# Patient Record
Sex: MALE | Race: WHITE | NOT HISPANIC OR LATINO
[De-identification: names, ages, dates, MRNs, and addresses within clinical notes are randomized per-mention and may not be internally consistent; named-entity substitution may affect disease eponyms.]

---

## 2018-08-29 PROBLEM — Z00.00 ENCOUNTER FOR PREVENTIVE HEALTH EXAMINATION: Status: ACTIVE | Noted: 2018-08-29

## 2018-08-30 ENCOUNTER — APPOINTMENT (OUTPATIENT)
Dept: ORTHOPEDIC SURGERY | Facility: CLINIC | Age: 33
End: 2018-08-30
Payer: MEDICAID

## 2018-08-30 VITALS
BODY MASS INDEX: 25.18 KG/M2 | SYSTOLIC BLOOD PRESSURE: 112 MMHG | HEIGHT: 69 IN | DIASTOLIC BLOOD PRESSURE: 68 MMHG | HEART RATE: 69 BPM | WEIGHT: 170 LBS

## 2018-08-30 PROCEDURE — 99204 OFFICE O/P NEW MOD 45 MIN: CPT

## 2018-09-01 ENCOUNTER — OUTPATIENT (OUTPATIENT)
Dept: OUTPATIENT SERVICES | Facility: HOSPITAL | Age: 33
LOS: 1 days | End: 2018-09-01
Payer: MEDICAID

## 2018-09-01 PROCEDURE — G9001: CPT

## 2018-09-06 ENCOUNTER — OUTPATIENT (OUTPATIENT)
Dept: OUTPATIENT SERVICES | Facility: HOSPITAL | Age: 33
LOS: 1 days | End: 2018-09-06
Payer: MEDICAID

## 2018-09-06 VITALS
HEIGHT: 67.5 IN | TEMPERATURE: 98 F | SYSTOLIC BLOOD PRESSURE: 123 MMHG | OXYGEN SATURATION: 98 % | DIASTOLIC BLOOD PRESSURE: 69 MMHG | HEART RATE: 79 BPM | RESPIRATION RATE: 16 BRPM

## 2018-09-06 DIAGNOSIS — S83.242A OTHER TEAR OF MEDIAL MENISCUS, CURRENT INJURY, LEFT KNEE, INITIAL ENCOUNTER: ICD-10-CM

## 2018-09-06 DIAGNOSIS — M25.562 PAIN IN LEFT KNEE: ICD-10-CM

## 2018-09-06 DIAGNOSIS — Z01.818 ENCOUNTER FOR OTHER PREPROCEDURAL EXAMINATION: ICD-10-CM

## 2018-09-06 PROCEDURE — G0463: CPT

## 2018-09-10 ENCOUNTER — TRANSCRIPTION ENCOUNTER (OUTPATIENT)
Age: 33
End: 2018-09-10

## 2018-09-11 ENCOUNTER — APPOINTMENT (OUTPATIENT)
Dept: ORTHOPEDIC SURGERY | Facility: HOSPITAL | Age: 33
End: 2018-09-11

## 2018-09-11 ENCOUNTER — OUTPATIENT (OUTPATIENT)
Dept: OUTPATIENT SERVICES | Facility: HOSPITAL | Age: 33
LOS: 1 days | End: 2018-09-11
Payer: MEDICAID

## 2018-09-11 ENCOUNTER — RESULT REVIEW (OUTPATIENT)
Age: 33
End: 2018-09-11

## 2018-09-11 VITALS
RESPIRATION RATE: 20 BRPM | SYSTOLIC BLOOD PRESSURE: 126 MMHG | DIASTOLIC BLOOD PRESSURE: 75 MMHG | HEART RATE: 76 BPM | OXYGEN SATURATION: 100 %

## 2018-09-11 VITALS
OXYGEN SATURATION: 100 % | TEMPERATURE: 98 F | HEIGHT: 68 IN | WEIGHT: 160.72 LBS | RESPIRATION RATE: 20 BRPM | HEART RATE: 69 BPM

## 2018-09-11 DIAGNOSIS — S83.242A OTHER TEAR OF MEDIAL MENISCUS, CURRENT INJURY, LEFT KNEE, INITIAL ENCOUNTER: ICD-10-CM

## 2018-09-11 PROCEDURE — G8979: CPT | Mod: CK

## 2018-09-11 PROCEDURE — G8978: CPT | Mod: CK

## 2018-09-11 PROCEDURE — G8980: CPT | Mod: CK

## 2018-09-11 PROCEDURE — 29882 ARTHRS KNE SRG MNISC RPR M/L: CPT | Mod: LT

## 2018-09-11 PROCEDURE — 97161 PT EVAL LOW COMPLEX 20 MIN: CPT | Mod: CK

## 2018-09-11 PROCEDURE — 88304 TISSUE EXAM BY PATHOLOGIST: CPT

## 2018-09-11 PROCEDURE — 88304 TISSUE EXAM BY PATHOLOGIST: CPT | Mod: 26

## 2018-09-11 RX ORDER — OXYCODONE AND ACETAMINOPHEN 5; 325 MG/1; MG/1
1 TABLET ORAL ONCE
Qty: 0 | Refills: 0 | Status: DISCONTINUED | OUTPATIENT
Start: 2018-09-11 | End: 2018-09-11

## 2018-09-11 RX ORDER — SODIUM CHLORIDE 9 MG/ML
1000 INJECTION, SOLUTION INTRAVENOUS
Qty: 0 | Refills: 0 | Status: DISCONTINUED | OUTPATIENT
Start: 2018-09-11 | End: 2018-09-12

## 2018-09-11 RX ORDER — CEFAZOLIN SODIUM 1 G
2000 VIAL (EA) INJECTION ONCE
Qty: 0 | Refills: 0 | Status: COMPLETED | OUTPATIENT
Start: 2018-09-11 | End: 2018-09-11

## 2018-09-11 RX ORDER — IBUPROFEN 200 MG
1 TABLET ORAL
Qty: 0 | Refills: 0 | COMMUNITY

## 2018-09-11 RX ORDER — HYDROMORPHONE HYDROCHLORIDE 2 MG/ML
0.5 INJECTION INTRAMUSCULAR; INTRAVENOUS; SUBCUTANEOUS
Qty: 0 | Refills: 0 | Status: DISCONTINUED | OUTPATIENT
Start: 2018-09-11 | End: 2018-09-12

## 2018-09-11 RX ADMIN — SODIUM CHLORIDE 50 MILLILITER(S): 9 INJECTION, SOLUTION INTRAVENOUS at 10:18

## 2018-09-11 RX ADMIN — OXYCODONE AND ACETAMINOPHEN 1 TABLET(S): 5; 325 TABLET ORAL at 11:20

## 2018-09-11 RX ADMIN — HYDROMORPHONE HYDROCHLORIDE 0.5 MILLIGRAM(S): 2 INJECTION INTRAMUSCULAR; INTRAVENOUS; SUBCUTANEOUS at 10:35

## 2018-09-11 RX ADMIN — HYDROMORPHONE HYDROCHLORIDE 0.5 MILLIGRAM(S): 2 INJECTION INTRAMUSCULAR; INTRAVENOUS; SUBCUTANEOUS at 10:20

## 2018-09-11 RX ADMIN — OXYCODONE AND ACETAMINOPHEN 1 TABLET(S): 5; 325 TABLET ORAL at 10:50

## 2018-09-11 NOTE — ASU DISCHARGE PLAN (ADULT/PEDIATRIC). - NOTIFY
Swelling that continues/Bleeding that does not stop/Fever greater than 101/Numbness, color, or temperature change to extremity/Pain not relieved by Medications Numbness, tingling/Pain not relieved by Medications/Inability to Tolerate Liquids or Foods/Bleeding that does not stop/Numbness, color, or temperature change to extremity/Increased Irritability or Sluggishness/Swelling that continues/Persistent Nausea and Vomiting/Fever greater than 101

## 2018-09-11 NOTE — ASU DISCHARGE PLAN (ADULT/PEDIATRIC). - SPECIAL INSTRUCTIONS
Non weight bearing to left lower extremity  Continue use of knee immobilizer of left lower extremity  - Call your doctor if you experience:  • An increase in pain not controlled by pain medication or change in activity or  position.  • Temperature greater than 101° F.  • Redness, increased swelling or foul smelling drainage from or around the  incision.  • Numbness, tingling or a change in color or temperature of the operative extremity.  • Call your doctor immediately if you experience chest pain, shortness of breath or calf pain.  Follow all verbal and written instructions. Take medications as prescribed. DO NOT drive, operate machinery, and/or make important decisions while on prescription pain medication. DO NOT hesitate to call Doctor's office with questions or concerns.

## 2018-09-11 NOTE — ASU DISCHARGE PLAN (ADULT/PEDIATRIC). - INSTRUCTIONS
Phone: [phone]  [facility] [address], [address], [place] [address]

## 2018-09-11 NOTE — BRIEF OPERATIVE NOTE - PRE-OP DX
Bucket-handle tear of medial meniscus of left knee as current injury, initial encounter  09/11/2018    Active  Francesco Orr

## 2018-09-11 NOTE — ASU DISCHARGE PLAN (ADULT/PEDIATRIC). - MEDICATION SUMMARY - MEDICATIONS TO TAKE
I will START or STAY ON the medications listed below when I get home from the hospital:    Percocet 5/325 oral tablet  -- 1 tab(s) by mouth every 6 hours as needed for pain MDD:4  Reference #: [id number]  -- Caution federal law prohibits the transfer of this drug to any person other  than the person for whom it was prescribed.  May cause drowsiness.  Alcohol may intensify this effect.  Use care when operating dangerous machinery.  This prescription cannot be refilled.  This product contains acetaminophen.  Do not use  with any other product containing acetaminophen to prevent possible liver damage.  Using more of this medication than prescribed may cause serious breathing problems.    -- Indication: For Pain

## 2018-09-11 NOTE — BRIEF OPERATIVE NOTE - PROCEDURE
<<-----Click on this checkbox to enter Procedure Arthroscopic repair of meniscus of knee  09/11/2018  left knee  Active  [email or web address]

## 2018-09-12 PROBLEM — M25.562 PAIN IN LEFT KNEE: Chronic | Status: ACTIVE | Noted: 2018-09-06

## 2018-09-12 PROBLEM — Z86.59 PERSONAL HISTORY OF OTHER MENTAL AND BEHAVIORAL DISORDERS: Chronic | Status: ACTIVE | Noted: 2018-09-06

## 2018-09-14 ENCOUNTER — APPOINTMENT (OUTPATIENT)
Dept: ORTHOPEDIC SURGERY | Facility: HOSPITAL | Age: 33
End: 2018-09-14

## 2018-09-18 ENCOUNTER — APPOINTMENT (OUTPATIENT)
Dept: ORTHOPEDIC SURGERY | Facility: CLINIC | Age: 33
End: 2018-09-18
Payer: MEDICAID

## 2018-09-18 VITALS — HEART RATE: 69 BPM | SYSTOLIC BLOOD PRESSURE: 137 MMHG | DIASTOLIC BLOOD PRESSURE: 77 MMHG

## 2018-09-18 DIAGNOSIS — Z98.890 OTHER SPECIFIED POSTPROCEDURAL STATES: ICD-10-CM

## 2018-09-18 PROCEDURE — 99024 POSTOP FOLLOW-UP VISIT: CPT

## 2018-09-19 ENCOUNTER — EMERGENCY (EMERGENCY)
Facility: HOSPITAL | Age: 33
LOS: 1 days | Discharge: HOME | End: 2018-09-19
Attending: EMERGENCY MEDICINE
Payer: MEDICAID

## 2018-09-19 VITALS
WEIGHT: 169.98 LBS | TEMPERATURE: 98 F | OXYGEN SATURATION: 95 % | DIASTOLIC BLOOD PRESSURE: 60 MMHG | HEIGHT: 68 IN | RESPIRATION RATE: 20 BRPM | HEART RATE: 115 BPM | SYSTOLIC BLOOD PRESSURE: 140 MMHG

## 2018-09-19 PROCEDURE — 99284 EMERGENCY DEPT VISIT MOD MDM: CPT

## 2018-09-19 NOTE — ED PROVIDER NOTE - ATTENDING CONTRIBUTION TO CARE
I have seen and evaluated this patient with the resident.   I agree with the findings  unless other wise stated.  I have made appropriate changes in documentations where needed, After my face to face bedside evaluation, I am further  notinM with L knee surgery for meniscus tear, intermittent tingling and edema of L knee. No chest pain, palpitation or shortness of breath. L leg exam is normal other than L knee surgical site. Will obtain US duplex study of LLE, rule out DVT, and reevaluate.

## 2018-09-19 NOTE — ED PROVIDER NOTE - PLAN OF CARE
Outpatient followup You presented with lower leg swelling and numbness/tingling of the foot. We ruled out a blood clot in the leg, called a deep venous thrombosis or DVT, with an ultrasound scan of the veins in the leg. You should take one full-dose aspirin, once a day, for 1 week. Follow up with your primary medical doctor within 1 week of discharge and obtain a repeat ultrasound of the veins of the leg within 5 days.

## 2018-09-19 NOTE — ED ADULT NURSE NOTE - NSIMPLEMENTINTERV_GEN_ALL_ED
Implemented All Fall Risk Interventions:  [place] to call system. Call bell, personal items and telephone within reach. Instruct patient to call for assistance. Room bathroom lighting operational. Non-slip footwear when patient is off stretcher. Physically safe environment: no spills, clutter or unnecessary equipment. Stretcher in lowest position, wheels locked, appropriate side rails in place. Provide visual cue, wrist band, yellow gown, etc. Monitor gait and stability. Monitor for mental status changes and reorient to person, place, and time. Review medications for side effects contributing to fall risk. Reinforce activity limits and safety measures with patient and family.

## 2018-09-19 NOTE — ED PROVIDER NOTE - OBJECTIVE STATEMENT
33M with PMHx of anxiety, L knee pain, PSHx of L meniscus repair (recently at [facility]), presents to the ED with complaints of tingling and mild edema of LLE. 33M with PMHx of anxiety, L knee pain, PSHx of L meniscal bucket tear repair (at [place]), presents to the ED with complaints of intermittent tingling below the ball of his foot and mild edema of L knee. Notes relief with elevation of his leg. Pt saw his orthopedic surgeon yesterday for follow-up and discussed concern for presenting symptoms and was directed to the ED. Denies chest pain, shortness of breath, palpitation, or any other complaints at this time. Admits to taking percocet TID for post-surgical relief.

## 2018-09-19 NOTE — ED ADULT NURSE NOTE - OBJECTIVE STATEMENT
33y male presents to ED complaining of LLE swelling. Pt is a/ox3 states that he had meniscus repair surgery on 9/11, presents today stating he's had increased swelling in L foot since yesterday. Pt has been taking percocet for pain, had followup appointment yesterday with ortho with no complications. Pt breathing spontaneous and unlabored with lungs clear to auscultation bilaterally. Pt skin is warm, dry and intact with no edema present. Pt PERRL, with equal strength bilaterally in upper and lower extremities with full sensation. Pt denies chest pain and SOB, denies n/v/d, denies fever/chills and cough, denies dysuria, denies numbness/tingling and weakness.

## 2018-09-19 NOTE — ED PROVIDER NOTE - MEDICAL DECISION MAKING DETAILS
33M with L knee surgery for meniscus tear, intermittent tingling and edema of L knee. No chest pain, palpitation or shortness of breath. L leg exam is normal other than L knee surgical site. Will obtain US duplex study of LLE, rule out DVT, and reevaluate.

## 2018-09-19 NOTE — ED PROVIDER NOTE - CARE PLAN
Principal Discharge DX:	Acute pain of left knee  Goal:	Outpatient followup  Assessment and plan of treatment:	You presented with lower leg swelling and numbness/tingling of the foot. We ruled out a blood clot in the leg, called a deep venous thrombosis or DVT, with an ultrasound scan of the veins in the leg. You should take one full-dose aspirin, once a day, for 1 week. Follow up with your primary medical doctor within 1 week of discharge and obtain a repeat ultrasound of the veins of the leg within 5 days.

## 2018-09-19 NOTE — ED PROVIDER NOTE - MUSCULOSKELETAL MINIMAL EXAM
No edema. Intact sensation BL. Intact motor on flexion BL. L foot is cooler than R foot. DP pulses BL. No tenderness to palpation BL. No erythema./motor intact

## 2018-09-20 ENCOUNTER — APPOINTMENT (OUTPATIENT)
Dept: ORTHOPEDIC SURGERY | Facility: CLINIC | Age: 33
End: 2018-09-20

## 2018-09-20 VITALS
RESPIRATION RATE: 17 BRPM | DIASTOLIC BLOOD PRESSURE: 73 MMHG | SYSTOLIC BLOOD PRESSURE: 125 MMHG | HEART RATE: 77 BPM | OXYGEN SATURATION: 100 %

## 2018-09-20 PROCEDURE — 93971 EXTREMITY STUDY: CPT | Mod: 26

## 2018-09-20 PROCEDURE — 99284 EMERGENCY DEPT VISIT MOD MDM: CPT

## 2018-09-20 PROCEDURE — 93971 EXTREMITY STUDY: CPT

## 2018-09-20 RX ORDER — ASPIRIN/CALCIUM CARB/MAGNESIUM 324 MG
1 TABLET ORAL
Qty: 5 | Refills: 0 | OUTPATIENT
Start: 2018-09-20 | End: 2018-09-24

## 2018-09-22 DIAGNOSIS — Z98.890 OTHER SPECIFIED POSTPROCEDURAL STATES: Chronic | ICD-10-CM

## 2018-09-24 ENCOUNTER — CHART COPY (OUTPATIENT)
Age: 33
End: 2018-09-24

## 2018-09-24 DIAGNOSIS — Z71.89 OTHER SPECIFIED COUNSELING: ICD-10-CM

## 2018-10-02 ENCOUNTER — APPOINTMENT (OUTPATIENT)
Dept: ORTHOPEDIC SURGERY | Facility: CLINIC | Age: 33
End: 2018-10-02
Payer: MEDICAID

## 2018-10-02 VITALS — HEART RATE: 56 BPM | DIASTOLIC BLOOD PRESSURE: 71 MMHG | SYSTOLIC BLOOD PRESSURE: 122 MMHG

## 2018-10-02 PROCEDURE — 99024 POSTOP FOLLOW-UP VISIT: CPT

## 2018-10-11 NOTE — ED PROVIDER NOTE - CONSTITUTIONAL, MLM
I have allowed him to only be seen once a year  He is due for his 1 year follow-up    Refills declined normal... Well appearing, well nourished, awake, alert, oriented to person, place, time/situation and in no apparent distress.

## 2018-10-23 ENCOUNTER — APPOINTMENT (OUTPATIENT)
Dept: ORTHOPEDIC SURGERY | Facility: CLINIC | Age: 33
End: 2018-10-23
Payer: MEDICAID

## 2018-10-23 VITALS
DIASTOLIC BLOOD PRESSURE: 80 MMHG | BODY MASS INDEX: 25.03 KG/M2 | WEIGHT: 169 LBS | SYSTOLIC BLOOD PRESSURE: 128 MMHG | HEART RATE: 62 BPM | HEIGHT: 69 IN

## 2018-10-23 PROCEDURE — 99024 POSTOP FOLLOW-UP VISIT: CPT

## 2018-12-04 ENCOUNTER — APPOINTMENT (OUTPATIENT)
Dept: ORTHOPEDIC SURGERY | Facility: CLINIC | Age: 33
End: 2018-12-04
Payer: MEDICAID

## 2018-12-04 VITALS
BODY MASS INDEX: 24.59 KG/M2 | DIASTOLIC BLOOD PRESSURE: 66 MMHG | HEIGHT: 69 IN | HEART RATE: 75 BPM | SYSTOLIC BLOOD PRESSURE: 111 MMHG | WEIGHT: 166 LBS

## 2018-12-04 PROCEDURE — 99024 POSTOP FOLLOW-UP VISIT: CPT

## 2018-12-04 RX ORDER — OXYCODONE AND ACETAMINOPHEN 5; 325 MG/1; MG/1
5-325 TABLET ORAL
Qty: 40 | Refills: 0 | Status: DISCONTINUED | COMMUNITY
Start: 2018-09-24 | End: 2018-12-04

## 2019-01-16 ENCOUNTER — APPOINTMENT (OUTPATIENT)
Dept: ORTHOPEDIC SURGERY | Facility: CLINIC | Age: 34
End: 2019-01-16
Payer: MEDICAID

## 2019-01-16 VITALS — BODY MASS INDEX: 24.59 KG/M2 | HEIGHT: 69 IN | WEIGHT: 166 LBS

## 2019-01-16 PROCEDURE — 99213 OFFICE O/P EST LOW 20 MIN: CPT

## 2019-01-17 NOTE — RETURN TO WORK/SCHOOL
[FreeTextEntry1] : Patient is unable to serve on jury duty at this time, will be able to participate in 2 months.  Patient had recent knee surgery.

## 2019-01-17 NOTE — PHYSICAL EXAM
[UE/LE] : Sensory: Intact in bilateral upper & lower extremities [ALL] : dorsalis pedis, posterior tibial, femoral, popliteal, and radial 2+ and symmetric bilaterally [Normal] : Oriented to person, place, and time, insight and judgement were intact and the affect was normal [Poor Appearance] : well-appearing [Acute Distress] : not in acute distress [de-identified] : left knee exam\par healed arthroscopy portals\par neg infection/drainage\par neg joint line ttp (medial or lateral)\par neg Marium\par neg effusion\par neg ecchymosis, erythema, deformity, crepitus\par ROM: 0-135 degrees\par stable to stress\par nvid BLE\par ambulates without assistive devices\par normal gait \par \par Constitutional: The patient is otherwise well-developed, well-nourished, in no acute distress. Vital signs reviewed. \par HEENT (Head, Eyes, Ears, Nose, Throat) :\par Head: normocephalic/atraumatic. \par Eye exam: Conjunctiva normal. Lids normal. Pupils/iris normal.\par Ears, nose, mouth, and throat: Pinnae and canals normal. Nose and mucosa normal. Oral mucosa, gingiva and tongue normal. Oropharynx clear and moist with no exudate. \par Neck: Supple. Negative thyromegaly. Negative tracheal deviation. Negative stridor. Negative JVD. Negative cervical adenopathy. \par Cardiovascular exam: Unless otherwise noted, normal rate and regular rhythm.\par Upper and Lower Extremity pulses normal bilaterally. Unless otherwise noted, no dependent edema present.\par Pulmonary/Chest Wall: Effort normal. Breath sound normal. Negative respiratory distress. Negative wheezes, rales or chest tenderness. \par Abdominal Exam: soft. Bowel sounds normal. Negative distension, tenderness, rebound or guarding.\par Lymphatic exam: Normal cervical lymph nodes, no axillary adenopathy.\par Musculoskeletal exam:  Unless otherwise noted, remainder of the patient's musculoskeletal examination shows no other significant ecchymosis, edema, erythema, warmth, crepitus, or gross clinical deformity. This is for the remaining bilateral upper and lower extremity joints. Unless otherwise noted, patient has intact range of motion of the bilateral shoulders, elbows, wrists, hips, knees, and ankles. These joints are also otherwise stable to stress on examination.\par Skin: Warm and dry. Negative rashes, lymphadenopathy, or erythema. Nail pits absent and psoriasis (unless otherwise noted) absent.\par Genitourinary/Rectal Exam: deferred.\par Neurologic exam: Alert and oriented x3. Cranial nerves II through XII grossly intact. Speech normal. Memory and orientation normal. Unless otherwise noted, sensation intact throughout the upper and lower extremities to light touch and/or pinprick. Unless otherwise noted, reflexes were normal and symmetric in both upper and lower extremities. No pathologic reflexes identified. \par Psychiatric exam: Mood and affect normal and socially appropriate. Behavior normal. Thought content and judgement normal.

## 2019-01-17 NOTE — DISCUSSION/SUMMARY
[Medication Risks Reviewed] : Medication risks reviewed [Surgical risks reviewed] : Surgical risks reviewed [de-identified] : Diagnosis is 4 months status arthroscopic meniscal repair of a left knee isolated displaced bucket-handle medial meniscus tear.\par Patient is doing well clinically.\par Continue formal physical therapy until reaches maximum medical benefit/improvement.\par Follow up with me in 2 months and or a p.r.n. basis otherwise.\par

## 2019-01-17 NOTE — HISTORY OF PRESENT ILLNESS
[de-identified] : Established patient of the office.\par Last seen on December 4, 2018.\par see last note for details.\par Status post left knee arthroscopic meniscal repair of a displaced bucket-handle medial meniscus tear.\par Still going to physical therapy for same.\par Clinically stable.\par Notes good clinical improvement in symptoms since preoperative state.\par Feels / states that his left knee is feeling better.\par Notes occasional soreness and pain, primarily after therapy sessions.\par He does not localize the pain specifically to the medial aspect of his knee, but describes it as a more generalized soreness.\par Denies instability complaints.\par He denies locking episodes.\par He has residual weakness complaints and notes therapy is working on strengthening his bilateral quads\par Ambulating without assistive devices \par ambulating without a brace.\par

## 2019-03-18 ENCOUNTER — APPOINTMENT (OUTPATIENT)
Dept: ORTHOPEDIC SURGERY | Facility: CLINIC | Age: 34
End: 2019-03-18

## 2019-03-25 ENCOUNTER — APPOINTMENT (OUTPATIENT)
Dept: ORTHOPEDIC SURGERY | Facility: CLINIC | Age: 34
End: 2019-03-25

## 2021-02-09 NOTE — ASU DISCHARGE PLAN (ADULT/PEDIATRIC). - MEDICATION SUMMARY - MEDICATIONS TO STOP TAKING
I will STOP taking the medications listed below when I get home from the hospital:    Motrin 400 mg oral tablet  -- 1 tab(s) by mouth every 6 hours, As Needed Topical Retinoid counseling:  Patient advised to apply a pea-sized amount only at bedtime and wait 30 minutes after washing their face before applying.  If too drying, patient may add a non-comedogenic moisturizer. The patient verbalized understanding of the proper use and possible adverse effects of retinoids.  All of the patient's questions and concerns were addressed.

## 2022-03-08 NOTE — H&P PST ADULT - ENMT
Please call your pharmacy if you need any refills of your medication(s). Please call our office at [phone]  if you don't hear from us about your test results. Please be sure to call our office if your illness/problem has been treated for but has not completely resolved. Bring an accurate list of your medications with you at every appointment to ensure that we have the correct information.     Our office hours are: Monday - Friday 7 am- 5 pm negative No oral lesions; no gross abnormalities

## 2022-10-31 ENCOUNTER — APPOINTMENT (OUTPATIENT)
Dept: ORTHOPEDIC SURGERY | Facility: CLINIC | Age: 37
End: 2022-10-31

## 2022-10-31 VITALS — BODY MASS INDEX: 24.44 KG/M2 | HEIGHT: 69 IN | WEIGHT: 165 LBS

## 2022-10-31 DIAGNOSIS — S83.242A OTHER TEAR OF MEDIAL MENISCUS, CURRENT INJURY, LEFT KNEE, INITIAL ENCOUNTER: ICD-10-CM

## 2022-10-31 DIAGNOSIS — S83.241A OTHER TEAR OF MEDIAL MENISCUS, CURRENT INJURY, RIGHT KNEE, INITIAL ENCOUNTER: ICD-10-CM

## 2022-10-31 DIAGNOSIS — Z98.890 OTHER SPECIFIED POSTPROCEDURAL STATES: ICD-10-CM

## 2022-10-31 PROCEDURE — 73562 X-RAY EXAM OF KNEE 3: CPT | Mod: LT

## 2022-10-31 PROCEDURE — 99203 OFFICE O/P NEW LOW 30 MIN: CPT

## 2022-11-04 ENCOUNTER — APPOINTMENT (OUTPATIENT)
Dept: MRI IMAGING | Facility: IMAGING CENTER | Age: 37
End: 2022-11-04

## 2022-11-18 ENCOUNTER — APPOINTMENT (OUTPATIENT)
Dept: MRI IMAGING | Facility: IMAGING CENTER | Age: 37
End: 2022-11-18

## 2024-03-01 ENCOUNTER — EMERGENCY (EMERGENCY)
Facility: HOSPITAL | Age: 39
LOS: 1 days | Discharge: HOME | End: 2024-03-01
Attending: EMERGENCY MEDICINE
Payer: MEDICAID

## 2024-03-01 VITALS
TEMPERATURE: 98 F | DIASTOLIC BLOOD PRESSURE: 81 MMHG | WEIGHT: 166.89 LBS | SYSTOLIC BLOOD PRESSURE: 135 MMHG | RESPIRATION RATE: 18 BRPM | OXYGEN SATURATION: 99 % | HEART RATE: 69 BPM | HEIGHT: 68 IN

## 2024-03-01 DIAGNOSIS — Z98.890 OTHER SPECIFIED POSTPROCEDURAL STATES: Chronic | ICD-10-CM

## 2024-03-01 LAB
ALBUMIN SERPL ELPH-MCNC: 4.9 G/DL — SIGNIFICANT CHANGE UP (ref 3.3–5)
ALP SERPL-CCNC: 54 U/L — SIGNIFICANT CHANGE UP (ref 40–120)
ALT FLD-CCNC: 28 U/L — SIGNIFICANT CHANGE UP (ref 10–45)
ANION GAP SERPL CALC-SCNC: 13 MMOL/L — SIGNIFICANT CHANGE UP (ref 5–17)
APPEARANCE UR: CLEAR — SIGNIFICANT CHANGE UP
AST SERPL-CCNC: 22 U/L — SIGNIFICANT CHANGE UP (ref 10–40)
BACTERIA # UR AUTO: NEGATIVE /HPF — SIGNIFICANT CHANGE UP
BASOPHILS # BLD AUTO: 0.04 K/UL — SIGNIFICANT CHANGE UP (ref 0–0.2)
BASOPHILS NFR BLD AUTO: 0.5 % — SIGNIFICANT CHANGE UP (ref 0–2)
BILIRUB SERPL-MCNC: 0.4 MG/DL — SIGNIFICANT CHANGE UP (ref 0.2–1.2)
BILIRUB UR-MCNC: NEGATIVE — SIGNIFICANT CHANGE UP
BUN SERPL-MCNC: 17 MG/DL — SIGNIFICANT CHANGE UP (ref 7–23)
CALCIUM SERPL-MCNC: 9.7 MG/DL — SIGNIFICANT CHANGE UP (ref 8.4–10.5)
CAST: 0 /LPF — SIGNIFICANT CHANGE UP (ref 0–4)
CHLORIDE SERPL-SCNC: 107 MMOL/L — SIGNIFICANT CHANGE UP (ref 96–108)
CO2 SERPL-SCNC: 22 MMOL/L — SIGNIFICANT CHANGE UP (ref 22–31)
COLOR SPEC: YELLOW — SIGNIFICANT CHANGE UP
CREAT SERPL-MCNC: 0.84 MG/DL — SIGNIFICANT CHANGE UP (ref 0.5–1.3)
DIFF PNL FLD: NEGATIVE — SIGNIFICANT CHANGE UP
EGFR: 116 ML/MIN/1.73M2 — SIGNIFICANT CHANGE UP
EOSINOPHIL # BLD AUTO: 0.04 K/UL — SIGNIFICANT CHANGE UP (ref 0–0.5)
EOSINOPHIL NFR BLD AUTO: 0.5 % — SIGNIFICANT CHANGE UP (ref 0–6)
GLUCOSE SERPL-MCNC: 114 MG/DL — HIGH (ref 70–99)
GLUCOSE UR QL: NEGATIVE MG/DL — SIGNIFICANT CHANGE UP
HCT VFR BLD CALC: 47.8 % — SIGNIFICANT CHANGE UP (ref 39–50)
HGB BLD-MCNC: 16.3 G/DL — SIGNIFICANT CHANGE UP (ref 13–17)
IMM GRANULOCYTES NFR BLD AUTO: 0.3 % — SIGNIFICANT CHANGE UP (ref 0–0.9)
KETONES UR-MCNC: ABNORMAL MG/DL
LEUKOCYTE ESTERASE UR-ACNC: NEGATIVE — SIGNIFICANT CHANGE UP
LIDOCAIN IGE QN: 28 U/L — SIGNIFICANT CHANGE UP (ref 7–60)
LYMPHOCYTES # BLD AUTO: 2 K/UL — SIGNIFICANT CHANGE UP (ref 1–3.3)
LYMPHOCYTES # BLD AUTO: 25.1 % — SIGNIFICANT CHANGE UP (ref 13–44)
MCHC RBC-ENTMCNC: 31.6 PG — SIGNIFICANT CHANGE UP (ref 27–34)
MCHC RBC-ENTMCNC: 34.1 GM/DL — SIGNIFICANT CHANGE UP (ref 32–36)
MCV RBC AUTO: 92.6 FL — SIGNIFICANT CHANGE UP (ref 80–100)
MONOCYTES # BLD AUTO: 0.75 K/UL — SIGNIFICANT CHANGE UP (ref 0–0.9)
MONOCYTES NFR BLD AUTO: 9.4 % — SIGNIFICANT CHANGE UP (ref 2–14)
NEUTROPHILS # BLD AUTO: 5.11 K/UL — SIGNIFICANT CHANGE UP (ref 1.8–7.4)
NEUTROPHILS NFR BLD AUTO: 64.2 % — SIGNIFICANT CHANGE UP (ref 43–77)
NITRITE UR-MCNC: NEGATIVE — SIGNIFICANT CHANGE UP
NRBC # BLD: 0 /100 WBCS — SIGNIFICANT CHANGE UP (ref 0–0)
PH UR: 5.5 — SIGNIFICANT CHANGE UP (ref 5–8)
PLATELET # BLD AUTO: 216 K/UL — SIGNIFICANT CHANGE UP (ref 150–400)
POTASSIUM SERPL-MCNC: 4.5 MMOL/L — SIGNIFICANT CHANGE UP (ref 3.5–5.3)
POTASSIUM SERPL-SCNC: 4.5 MMOL/L — SIGNIFICANT CHANGE UP (ref 3.5–5.3)
PROT SERPL-MCNC: 7.4 G/DL — SIGNIFICANT CHANGE UP (ref 6–8.3)
PROT UR-MCNC: NEGATIVE MG/DL — SIGNIFICANT CHANGE UP
RBC # BLD: 5.16 M/UL — SIGNIFICANT CHANGE UP (ref 4.2–5.8)
RBC # FLD: 12 % — SIGNIFICANT CHANGE UP (ref 10.3–14.5)
RBC CASTS # UR COMP ASSIST: 0 /HPF — SIGNIFICANT CHANGE UP (ref 0–4)
SODIUM SERPL-SCNC: 142 MMOL/L — SIGNIFICANT CHANGE UP (ref 135–145)
SP GR SPEC: 1.02 — SIGNIFICANT CHANGE UP (ref 1–1.03)
SQUAMOUS # UR AUTO: 0 /HPF — SIGNIFICANT CHANGE UP (ref 0–5)
UROBILINOGEN FLD QL: 0.2 MG/DL — SIGNIFICANT CHANGE UP (ref 0.2–1)
WBC # BLD: 7.96 K/UL — SIGNIFICANT CHANGE UP (ref 3.8–10.5)
WBC # FLD AUTO: 7.96 K/UL — SIGNIFICANT CHANGE UP (ref 3.8–10.5)
WBC UR QL: 0 /HPF — SIGNIFICANT CHANGE UP (ref 0–5)

## 2024-03-01 PROCEDURE — 82435 ASSAY OF BLOOD CHLORIDE: CPT

## 2024-03-01 PROCEDURE — 82803 BLOOD GASES ANY COMBINATION: CPT

## 2024-03-01 PROCEDURE — 82330 ASSAY OF CALCIUM: CPT

## 2024-03-01 PROCEDURE — 99285 EMERGENCY DEPT VISIT HI MDM: CPT

## 2024-03-01 PROCEDURE — 83605 ASSAY OF LACTIC ACID: CPT

## 2024-03-01 PROCEDURE — 83690 ASSAY OF LIPASE: CPT

## 2024-03-01 PROCEDURE — 85014 HEMATOCRIT: CPT

## 2024-03-01 PROCEDURE — 85018 HEMOGLOBIN: CPT

## 2024-03-01 PROCEDURE — 85025 COMPLETE CBC W/AUTO DIFF WBC: CPT

## 2024-03-01 PROCEDURE — 81001 URINALYSIS AUTO W/SCOPE: CPT

## 2024-03-01 PROCEDURE — 82947 ASSAY GLUCOSE BLOOD QUANT: CPT

## 2024-03-01 PROCEDURE — 93005 ELECTROCARDIOGRAM TRACING: CPT

## 2024-03-01 PROCEDURE — 99283 EMERGENCY DEPT VISIT LOW MDM: CPT | Mod: 25

## 2024-03-01 PROCEDURE — 84132 ASSAY OF SERUM POTASSIUM: CPT

## 2024-03-01 PROCEDURE — 80053 COMPREHEN METABOLIC PANEL: CPT

## 2024-03-01 PROCEDURE — 84295 ASSAY OF SERUM SODIUM: CPT

## 2024-03-01 NOTE — ED PROVIDER NOTE - CLINICAL SUMMARY MEDICAL DECISION MAKING FREE TEXT BOX
Dr. Fields: 38-year-old male history of hemorrhoids, no surgical history, here with fiancé complaining of 2 months of diffuse abdominal pain, constipation, hemorrhoids, bloating.  States symptoms started after getting COVID towards the end of last year.  Followed up with PMD last week and was told he has hemorrhoids.  Has an appointment next Friday with [facility] colorectal surgery and GI however would like an appointment sooner with [facility].  Symptoms not particularly worse today.  Smokes cigarettes but does not drink alcohol, denies other drugs, no other medications, has been applying steroid cream to the hemorrhoids, currently normal bowel movements, no nausea vomiting, no fevers or chills, no travel.    Gen: No acute distress  HEENT: Mucous membranes moist, pink conjunctivae, EOMI  CV: RRR, no clubbing/cyanosis/edema  Resp: CTAB  GI: Abdomen soft, NT, ND. Normal BS. No rebound, no guarding  : No CVAT  Neuro: A&O x 3, moving all 4 extremities  MSK: No spine or joint tenderness to palpation  Skin: No rashes    Patient with GI distress, asking about pancreatitis.  Will check labs, abdominal exam benign and does not need imaging at this time, will set up appointment with colorectal surgery and GI through her coordinator.  Patient and fiancé agreeable with plan.

## 2024-03-01 NOTE — ED PROVIDER NOTE - PHYSICAL EXAMINATION
NAD. VSS. Afebrile. Neck supple. Lungs clear. ABD soft, non tender. No CVA tender. No peripheral edema. Neuro- intact. NAD. VSS. Afebrile. Neck supple. Lungs clear. ABD soft, non tender. No visualized external hemorrhoids. No CVA tender. No peripheral edema. Neuro- intact.

## 2024-03-01 NOTE — ED PROVIDER NOTE - NSFOLLOWUPINSTRUCTIONS_ED_ALL_ED_FT
Please see the information of ABDOMINAL PAIN.    Stop smoking.    Hydrate.    Follow up with Gastroenterologist and Colorectal surgeon as scheduled.    Return for any concerns, fever, vomiting, diarrhea, rectal bleeding, or worsening symptoms.

## 2024-03-01 NOTE — ED PROVIDER NOTE - OBJECTIVE STATEMENT
39yo male pt with PMHx of Hemorrhoids, no PSHx, current smoker presents to ED with intermittent LUQ dull pain and nausea since January. Noticed worsening pain with coughing or movement. He has GI and colorectal appointment next week. Denies fever, chills, cough or cold symptoms. Denies V/D. Denies rectal bleeding. Denies urinary symptoms.

## 2024-03-01 NOTE — ED PROVIDER NOTE - ATTENDING APP SHARED VISIT CONTRIBUTION OF CARE
Dr. Fields: I performed a face to face bedside interview with patient regarding history of present illness, review of symptoms and past medical history. I completed an independent physical exam.  I have discussed patient's plan of care with NP. I agree with note as stated above, having amended the EMR as needed to reflect my findings.   This includes HISTORY OF PRESENT ILLNESS, HIV, PAST MEDICAL/SURGICAL/FAMILY/SOCIAL HISTORY, ALLERGIES AND HOME MEDICATIONS, REVIEW OF SYSTEMS, PHYSICAL EXAM, and any PROGRESS NOTES during the time I functioned as the attending physician for this patient.    see mdm

## 2024-03-01 NOTE — ED PROVIDER NOTE - PATIENT PORTAL LINK FT
You can access the FollowMyHealth Patient Portal offered by [facility] by registering at the following website: [email or web address]. By joining [facility]’s FollowMyHealth portal, you will also be able to view your health information using other applications (apps) compatible with our system.

## 2024-03-01 NOTE — ED ADULT NURSE NOTE - NSSEPSISNEWALTERMENTAL_ED_A_ED
Spoke with pt and informed he should bring in a CD with the images of his CT sinus to his appointment with Dr Diane Orozco at [facility] on 6-14-19, pt verbalized understanding.
No

## 2024-03-01 NOTE — ED PROVIDER NOTE - RAPID ASSESSMENT
37yo M smoker, no other PMH, presenting with hemorrhoid noticed in January and more recently with a dull ache on L-side of abdomen, feeling bloated, and sometimes entire abdominal discomfort. +mild associated nausea. Took miralax and had loose stools. +dysuria occasionally. Using topical hemorrhoid cream. Has upcoming f/u with colorectal but has not gone yet. Reports no new/worsening symptoms today but did not feel comfortable waiting for f/u appointment. Denies fever/chills, vomiting, melena, BRBPR, hematuria.       ANITA Haas: Patient seen by myself in triage area for rapid assessment only. Full H&P and complete work-up to be performed in main emergency department by ED providers.

## 2024-03-04 ENCOUNTER — APPOINTMENT (OUTPATIENT)
Dept: SURGERY | Facility: CLINIC | Age: 39
End: 2024-03-04
Payer: MEDICAID

## 2024-03-04 DIAGNOSIS — F17.200 NICOTINE DEPENDENCE, UNSPECIFIED, UNCOMPLICATED: ICD-10-CM

## 2024-03-04 DIAGNOSIS — Z78.9 OTHER SPECIFIED HEALTH STATUS: ICD-10-CM

## 2024-03-04 DIAGNOSIS — Z82.49 FAMILY HISTORY OF ISCHEMIC HEART DISEASE AND OTHER DISEASES OF THE CIRCULATORY SYSTEM: ICD-10-CM

## 2024-03-04 DIAGNOSIS — Z80.8 FAMILY HISTORY OF MALIGNANT NEOPLASM OF OTHER ORGANS OR SYSTEMS: ICD-10-CM

## 2024-03-08 ENCOUNTER — APPOINTMENT (OUTPATIENT)
Dept: GASTROENTEROLOGY | Facility: CLINIC | Age: 39
End: 2024-03-08
Payer: MEDICAID

## 2024-03-08 VITALS
RESPIRATION RATE: 12 BRPM | WEIGHT: 176 LBS | DIASTOLIC BLOOD PRESSURE: 70 MMHG | SYSTOLIC BLOOD PRESSURE: 110 MMHG | HEART RATE: 65 BPM | OXYGEN SATURATION: 98 % | TEMPERATURE: 97.8 F | BODY MASS INDEX: 26.67 KG/M2 | HEIGHT: 68 IN

## 2024-03-08 DIAGNOSIS — R14.0 ABDOMINAL DISTENSION (GASEOUS): ICD-10-CM

## 2024-03-08 DIAGNOSIS — R11.0 NAUSEA: ICD-10-CM

## 2024-03-08 DIAGNOSIS — Z86.59 PERSONAL HISTORY OF OTHER MENTAL AND BEHAVIORAL DISORDERS: ICD-10-CM

## 2024-03-08 DIAGNOSIS — R10.12 LEFT UPPER QUADRANT PAIN: ICD-10-CM

## 2024-03-08 DIAGNOSIS — R19.8 OTHER SPECIFIED SYMPTOMS AND SIGNS INVOLVING THE DIGESTIVE SYSTEM AND ABDOMEN: ICD-10-CM

## 2024-03-08 PROCEDURE — 99204 OFFICE O/P NEW MOD 45 MIN: CPT

## 2024-03-11 ENCOUNTER — APPOINTMENT (OUTPATIENT)
Dept: SURGERY | Facility: CLINIC | Age: 39
End: 2024-03-11
Payer: MEDICAID

## 2024-03-11 VITALS
HEART RATE: 79 BPM | BODY MASS INDEX: 25.46 KG/M2 | HEIGHT: 68 IN | SYSTOLIC BLOOD PRESSURE: 117 MMHG | WEIGHT: 168 LBS | OXYGEN SATURATION: 99 % | DIASTOLIC BLOOD PRESSURE: 66 MMHG

## 2024-03-11 DIAGNOSIS — K64.1 SECOND DEGREE HEMORRHOIDS: ICD-10-CM

## 2024-03-11 PROBLEM — R10.12 INTERMITTENT LEFT UPPER QUADRANT ABDOMINAL PAIN: Status: ACTIVE | Noted: 2024-03-11

## 2024-03-11 PROBLEM — Z86.59 HISTORY OF ANXIETY: Status: RESOLVED | Noted: 2024-03-11 | Resolved: 2024-03-11

## 2024-03-11 PROBLEM — R14.0 ABDOMINAL BLOATING: Status: ACTIVE | Noted: 2024-03-11

## 2024-03-11 PROBLEM — R19.8 BORBORYGMI: Status: ACTIVE | Noted: 2024-03-11

## 2024-03-11 PROBLEM — R11.0 NAUSEA: Status: ACTIVE | Noted: 2024-03-11

## 2024-03-11 PROCEDURE — 99203 OFFICE O/P NEW LOW 30 MIN: CPT | Mod: 25

## 2024-03-11 PROCEDURE — 46600 DIAGNOSTIC ANOSCOPY SPX: CPT

## 2024-03-11 NOTE — PHYSICAL EXAM
[Normal] : no respiratory distress, no accessory muscle use, normal respiratory rhythm and effort, lungs were clear to auscultation bilaterally [LUQ] : LUQ

## 2024-03-11 NOTE — ASSESSMENT
[FreeTextEntry1] : He most likely has Irritable Bowel Syndrome but must RlO PUD or gastritis  ZOYA ROWE was advised to undergo endoscopy to which he agreed. The procedure will be performed in [facility] with the assistance of an anesthesiologist. He was given a booklet distributed by the American Society of Gastro[employer] End[employer] explaining the procedure in detail and he understood the risks of the procedure not limited to infection, bleeding, perforation or non- diagnosis of gastric or esophageal cancer.  He was advised that he could not drive home, if he chooses to receive sedation. Further diagnostic and treatment recommendations will be based upon the procedure and any biopsies, if they are taken. Thank you for allowing me to participate in this [facility] health care.   I spent 47 minutes with the patient and answered  all of his questions

## 2024-03-11 NOTE — HISTORY OF PRESENT ILLNESS
[FreeTextEntry1] : He is a 38-year-old male who has a 2-month history  of nausea, left upper quadrant abdominal pain and bloating.  He denies heartburn or chest pain.  He denies constipation, diarrhea or rectal bleeding.  He admits to increased stress in  his life since the  holidays which seems to precipitate his symptoms.  He went to the emergency room where laboratory tests were all normal. He did  not have a CAT scan or ultrasound at that time

## 2024-03-11 NOTE — REVIEW OF SYSTEMS
[Abdominal Pain] : abdominal pain [Bloating (gassiness)] : bloating [Negative] : Endocrine [FreeTextEntry7] : Nausea

## 2024-03-11 NOTE — CONSULT LETTER
[Dear  ___] : Dear  [unfilled], [Consult Letter:] : I had the pleasure of evaluating your patient, [unfilled]. [Please see my note below.] : Please see my note below. [( Thank you for referring [unfilled] for consultation for _____ )] : Thank you for referring [unfilled] for consultation for [unfilled] [Consult Closing:] : Thank you very much for allowing me to participate in the care of this patient.  If you have any questions, please do not hesitate to contact me. [Sincerely,] : Sincerely, [FreeTextEntry3] : Bry Flynn MD  Gastroenterology [facility]

## 2024-03-12 PROBLEM — K64.1 SECOND DEGREE HEMORRHOIDS: Status: ACTIVE | Noted: 2024-03-12

## 2024-03-12 NOTE — PHYSICAL EXAM
[FreeTextEntry1] : This is a 38 year-old well-developed male in no apparent distress.  HEENT normocephalic, anicteric, external ears normal bilaterally, EOMs intact.  Cardiac - regular rate and rhythm.  Abdomen soft, nontender, nondistended, no masses. No hepatosplenomegaly.  No inguinal lymphadenopathy bilaterally.  Examination of the perineum reveals no external hemorrhoids, fissures or abscesses. Digital rectal examination reveals normal sphincter tone.  Anoscopy reveals small to moderate size internal hemorrhoids.  Neuro-cranial nerves grossly intact. Normal gait.  Psychiatric-oriented to time place and person. Good understanding of conversation.

## 2024-03-12 NOTE — ASSESSMENT
[FreeTextEntry1] : 38-year-old male with recent episode of perianal excess tissue and discomfort in the setting of constipation and diarrhea.  He appears to have had an episode of hemorrhoidal flareup.  He is currently asymptomatic. He does not need any further intervention regarding the anorectal area.   Patient reassured.   I advised him to follow the GI recommendations. RTO for any issues I can help him with.

## 2024-04-17 ENCOUNTER — APPOINTMENT (OUTPATIENT)
Dept: GASTROENTEROLOGY | Facility: AMBULATORY SURGERY CENTER | Age: 39
End: 2024-04-17

## 2025-01-23 NOTE — ED PROVIDER NOTE - TOBACCO USE
Patient called today with regards to the following prescription request: Current    Provider: Parth Tinsley MD  Medication: oxyCODONE, IMM REL, (ROXICODONE) 5 MG immediate release tablet   How do you take your medication: once per day  Supply Requested: refill      Medication dosage/(s) was verified with patient and is current: Yes  Pharmacy was loaded and verified Yes    For additional information, or if you need to contact the caller at the following number:    Contact Phone Number: [phone] (home) [phone] (work)    Patient states that it is okay to leave a detailed message.    Preferred time for callback: anytime    Advised Patient that refill processing may take up to 2 business day, and that the pharmacy will contact them when their prescription is ready for pickup.   Current every day smoker